# Patient Record
Sex: FEMALE | NOT HISPANIC OR LATINO | URBAN - METROPOLITAN AREA
[De-identification: names, ages, dates, MRNs, and addresses within clinical notes are randomized per-mention and may not be internally consistent; named-entity substitution may affect disease eponyms.]

---

## 2024-10-30 ENCOUNTER — OFFICE VISIT (OUTPATIENT)
Dept: URBAN - METROPOLITAN AREA CLINIC 118 | Facility: CLINIC | Age: 34
End: 2024-10-30

## 2024-12-12 ENCOUNTER — LAB OUTSIDE AN ENCOUNTER (OUTPATIENT)
Dept: URBAN - METROPOLITAN AREA CLINIC 118 | Facility: CLINIC | Age: 34
End: 2024-12-12

## 2024-12-12 ENCOUNTER — OFFICE VISIT (OUTPATIENT)
Dept: URBAN - METROPOLITAN AREA CLINIC 118 | Facility: CLINIC | Age: 34
End: 2024-12-12
Payer: COMMERCIAL

## 2024-12-12 ENCOUNTER — DASHBOARD ENCOUNTERS (OUTPATIENT)
Age: 34
End: 2024-12-12

## 2024-12-12 VITALS
HEIGHT: 64 IN | TEMPERATURE: 97.9 F | WEIGHT: 215 LBS | SYSTOLIC BLOOD PRESSURE: 111 MMHG | DIASTOLIC BLOOD PRESSURE: 68 MMHG | HEART RATE: 61 BPM | BODY MASS INDEX: 36.7 KG/M2

## 2024-12-12 DIAGNOSIS — R10.32 LLQ ABDOMINAL PAIN: ICD-10-CM

## 2024-12-12 DIAGNOSIS — R10.13 DYSPEPSIA: ICD-10-CM

## 2024-12-12 DIAGNOSIS — K21.00 GASTROESOPHAGEAL REFLUX DISEASE WITH ESOPHAGITIS WITHOUT HEMORRHAGE: ICD-10-CM

## 2024-12-12 DIAGNOSIS — R19.8 IRREGULAR BOWEL HABITS: ICD-10-CM

## 2024-12-12 PROBLEM — 266433003: Status: ACTIVE | Noted: 2024-12-12

## 2024-12-12 PROCEDURE — 99213 OFFICE O/P EST LOW 20 MIN: CPT | Performed by: INTERNAL MEDICINE

## 2024-12-12 NOTE — HPI-TODAY'S VISIT:
The patient is a 34-year-old female who self-referred for left-sided pain.  She was last seen in 2013 for an upper endoscopy that showed chronic gastritis and reflux.  She has been diagnosed with long COVID and has had left-sided abdominal pain, amongst other complaints for the last 3 to 4 years.  A CT scan in 2022 showed no evidence of obstruction or mass lesion.  She has regular bowel movements without blood.  She did undergo allergy testing which was positive at various levels for multiple foods.  She does have a history of asthma.  She takes a GI cocktail versus over-the-counter Prilosec as needed for her dyspepsia.  She has never had a colonoscopy.  There is a family history of renal cell carcinoma in her father.  There is no family history of colon cancer that she is aware of.

## 2025-03-04 ENCOUNTER — OFFICE VISIT (OUTPATIENT)
Dept: URBAN - METROPOLITAN AREA MEDICAL CENTER 16 | Facility: MEDICAL CENTER | Age: 35
End: 2025-03-04